# Patient Record
Sex: FEMALE | Race: WHITE | URBAN - METROPOLITAN AREA
[De-identification: names, ages, dates, MRNs, and addresses within clinical notes are randomized per-mention and may not be internally consistent; named-entity substitution may affect disease eponyms.]

---

## 2022-07-18 ENCOUNTER — APPOINTMENT (RX ONLY)
Dept: URBAN - METROPOLITAN AREA CLINIC 168 | Facility: CLINIC | Age: 87
Setting detail: DERMATOLOGY
End: 2022-07-18

## 2022-07-18 DIAGNOSIS — L30.9 DERMATITIS, UNSPECIFIED: ICD-10-CM | Status: STABLE

## 2022-07-18 DIAGNOSIS — L81.0 POSTINFLAMMATORY HYPERPIGMENTATION: ICD-10-CM | Status: STABLE

## 2022-07-18 PROCEDURE — ? TREATMENT REGIMEN

## 2022-07-18 PROCEDURE — ? COUNSELING

## 2022-07-18 PROCEDURE — 99203 OFFICE O/P NEW LOW 30 MIN: CPT

## 2022-07-18 ASSESSMENT — LOCATION DETAILED DESCRIPTION DERM
LOCATION DETAILED: RIGHT MID-UPPER BACK
LOCATION DETAILED: RIGHT RIB CAGE
LOCATION DETAILED: LEFT RIB CAGE
LOCATION DETAILED: LEFT MID-UPPER BACK

## 2022-07-18 ASSESSMENT — LOCATION SIMPLE DESCRIPTION DERM
LOCATION SIMPLE: ABDOMEN
LOCATION SIMPLE: RIGHT UPPER BACK
LOCATION SIMPLE: LEFT UPPER BACK

## 2022-07-18 ASSESSMENT — LOCATION ZONE DERM: LOCATION ZONE: TRUNK

## 2022-07-18 NOTE — PROCEDURE: TREATMENT REGIMEN
Detail Level: Zone
Discontinue Regimen: Scented lotion, plug in scented
Modify Regimen: Traimincinole 0.1% cream take 2 weeks off. If she flares come in same day, when restart:\\nUse twice a day for 2 weeks, then twice a day on the weekends for 2 weeks, then stop
Initiate Treatment: Free and clear laundry soap\\nCut nails shorter, try not to itch
Plan: Clinically there is few evenly distributed nummular, erythematous patches to the upper abdomen and back. At this time the patient has no symptoms, however reports history of itch. She is currently using Triamcinolone prescribed by other physician. Patient has had rash on and off for 3 years with each episode lasting 3 weeks at a time. Previous skin scraping by other office showed Shingles per patient and patient's son, however patient has had Shingrix vaccine and they reported the areas to cross the midline. At this time I do not see any signs of a viral or other type of infectious process. I favor this to be more inflammatory, however according to the patient and patient's son the rash is not currently in an active stage. I advised them to continue using Triamcinolone to the lesions and present ASAP to office during flare so we may have a better evaluation. Other differentials include fixed drug eruption and pityriasis rosea. May consider punch bx at next visit.
Otc Regimen: Dermaleve samples given. Can get purchase online.\\nSaran anti itch